# Patient Record
Sex: FEMALE
[De-identification: names, ages, dates, MRNs, and addresses within clinical notes are randomized per-mention and may not be internally consistent; named-entity substitution may affect disease eponyms.]

---

## 2017-05-12 ENCOUNTER — HOSPITAL ENCOUNTER (EMERGENCY)
Dept: HOSPITAL 14 - H.ER | Age: 32
Discharge: HOME | End: 2017-05-12
Payer: COMMERCIAL

## 2017-05-12 VITALS — BODY MASS INDEX: 23 KG/M2

## 2017-05-12 VITALS
OXYGEN SATURATION: 100 % | DIASTOLIC BLOOD PRESSURE: 69 MMHG | SYSTOLIC BLOOD PRESSURE: 107 MMHG | HEART RATE: 83 BPM | RESPIRATION RATE: 18 BRPM | TEMPERATURE: 98.8 F

## 2017-05-12 DIAGNOSIS — E86.0: ICD-10-CM

## 2017-05-12 DIAGNOSIS — R42: ICD-10-CM

## 2017-05-12 DIAGNOSIS — R55: Primary | ICD-10-CM

## 2017-05-12 LAB
ALBUMIN/GLOB SERPL: 1.4 {RATIO} (ref 1–2.1)
ALP SERPL-CCNC: 80 U/L (ref 38–126)
ALT SERPL-CCNC: 37 U/L (ref 9–52)
AST SERPL-CCNC: 43 U/L (ref 14–36)
BACTERIA #/AREA URNS HPF: (no result) /[HPF]
BASOPHILS # BLD AUTO: 0 K/UL (ref 0–0.2)
BASOPHILS NFR BLD: 0.2 % (ref 0–2)
BILIRUB SERPL-MCNC: 0.2 MG/DL (ref 0.2–1.3)
BILIRUB UR-MCNC: NEGATIVE MG/DL
BUN SERPL-MCNC: 10 MG/DL (ref 7–17)
CALCIUM SERPL-MCNC: 8.9 MG/DL (ref 8.4–10.2)
CHLORIDE SERPL-SCNC: 101 MMOL/L (ref 98–107)
CO2 SERPL-SCNC: 25 MMOL/L (ref 22–30)
COLOR UR: YELLOW
EOSINOPHIL # BLD AUTO: 0 K/UL (ref 0–0.7)
EOSINOPHIL NFR BLD: 0.2 % (ref 0–4)
ERYTHROCYTE [DISTWIDTH] IN BLOOD BY AUTOMATED COUNT: 13.2 % (ref 11.5–14.5)
ETHANOL SERPL-MCNC: < 10 MG/DL (ref 0–10)
GLOBULIN SER-MCNC: 2.9 GM/DL (ref 2.2–3.9)
GLUCOSE SERPL-MCNC: 115 MG/DL (ref 65–105)
GLUCOSE UR STRIP-MCNC: (no result) MG/DL
HCT VFR BLD CALC: 42.5 % (ref 34–47)
KETONES UR STRIP-MCNC: 20 MG/DL
LEUKOCYTE ESTERASE UR-ACNC: (no result) LEU/UL
LYMPHOCYTES # BLD AUTO: 0.8 K/UL (ref 1–4.3)
LYMPHOCYTES NFR BLD AUTO: 11.1 % (ref 20–40)
MAGNESIUM SERPL-MCNC: 1.9 MG/DL (ref 1.6–2.3)
MCH RBC QN AUTO: 30.8 PG (ref 27–31)
MCHC RBC AUTO-ENTMCNC: 33.3 G/DL (ref 33–37)
MCV RBC AUTO: 92.6 FL (ref 81–99)
MONOCYTES # BLD: 0.7 K/UL (ref 0–0.8)
MONOCYTES NFR BLD: 9.5 % (ref 0–10)
NEUTROPHILS # BLD: 5.4 K/UL (ref 1.8–7)
NEUTROPHILS NFR BLD AUTO: 79 % (ref 50–75)
NRBC BLD AUTO-RTO: 0.1 % (ref 0–0)
PH UR STRIP: 6 [PH] (ref 5–8)
PHOSPHATE SERPL-MCNC: 2.9 MG/DL (ref 2.5–4.5)
PLATELET # BLD: 236 K/UL (ref 130–400)
PMV BLD AUTO: 7.7 FL (ref 7.2–11.7)
POTASSIUM SERPL-SCNC: 3.6 MMOL/L (ref 3.6–5)
PROT SERPL-MCNC: 7.1 G/DL (ref 6.3–8.2)
PROT UR STRIP-MCNC: NEGATIVE MG/DL
RBC # UR STRIP: (no result) /UL
RBC #/AREA URNS HPF: 3 /HPF (ref 0–3)
SODIUM SERPL-SCNC: 137 MMOL/L (ref 132–148)
SP GR UR STRIP: 1.01 (ref 1–1.03)
UROBILINOGEN UR-MCNC: (no result) MG/DL (ref 0.2–1)
WBC # BLD AUTO: 6.9 K/UL (ref 4.8–10.8)
WBC #/AREA URNS HPF: 1 /HPF (ref 0–5)

## 2017-05-12 NOTE — ED PDOC
Syncope/Near Syncope/Dizzyness


Chief Complaint (Provider): Syncope


History Per: Patient


History/Exam Limitations: no limitations


Onset/Duration Of Symptoms: Mins (1)


Current Symptoms Are (Timing): Better


Number Of Syncopal Episodes: 1


Activity At Onset Of Symptoms: Sitting


Associated Symptoms Preceding Syncopal Episode: Lightheadedness


Seizure Or Post-ictal Symptoms: None


Fall Associated With With Symptoms: No


Additional Complaint(s): 


31 year old female with medical history of Celiac Disease presents to ED via 

EMS due to syncopal episode. Patient states was sitting at nail salon receiving 

a chair massage when she began to feel lightheadedness, followed by LOC, which 

was witnessed and approx 30-60 seconds. No reported shaking of body or foaming 

of mouth. Denies urinary/stool incontinence or tongue biting. No reported 

palpitations, chest pain, sob, headache, visual changes, nausea, vomiting, or 

diarrhea. Patient states had kale shake and veggie chips today, which is not 

unusual for her. Denies use of drugs or etoh. No fever, chills. Has been having 

symptoms of cold since Monday (nasal congestion/post nasal drip). Patient 

states she still feels some lightheadedness though denies weakness. 





PMD: Dr. Chiari





<Ross Mendez - Last Filed: 05/12/17 18:53>





<Tennille Mtz - Last Filed: 05/12/17 22:58>


Time Seen by Provider: 05/12/17 16:50


Chief Complaint (Nursing): Syncope





Supervising Attending Note





- Supervising Attending Note


The Documented history was done by the: Physician Extender, Attending Physician


The documented physical exam was done by the: Physician Extender, Attending 

Physician





- Attestation:


I have personally seen and examined this patient.: Yes


I have fully participated in the care of the patient.: Yes


I have reviewed all pertinent clinical information: Yes





<Tennille Mtz - Last Filed: 05/12/17 22:58>





Past Medical History


Reviewed: Historical Data, Nursing Documentation, Vital Signs


Vital Signs: 





 Last Vital Signs











Temp  98.8 F   05/12/17 16:46


 


Pulse  83   05/12/17 16:46


 


Resp  18   05/12/17 16:46


 


BP  107/69   05/12/17 16:46


 


Pulse Ox  100   05/12/17 16:46














- Family History


Family History: States: Unknown Family Hx





<Ross Mendez - Last Filed: 05/12/17 18:53>


Vital Signs: 





 Last Vital Signs











Temp  98.8 F   05/12/17 16:46


 


Pulse  83   05/12/17 16:46


 


Resp  18   05/12/17 16:46


 


BP  107/69   05/12/17 16:46


 


Pulse Ox  100   05/12/17 18:56














<Tennille Mtz - Last Filed: 05/12/17 22:58>





- Home Medications


Home Medications: 


 Ambulatory Orders











 Medication  Instructions  Recorded


 


Tamsulosin [Flomax] 0.4 mg PO DAILY #5 cap 02/25/17


 


traMADol [Ultram] 50 mg PO Q8 #10 tab 02/25/17


 


Ondansetron ODT [Zofran ODT] 4 mg PO Q6 PRN #10 odt 02/26/17














- Allergies


Allergies/Adverse Reactions: 


 Allergies











Allergy/AdvReac Type Severity Reaction Status Date / Time


 


wheat dextrin AdvReac  PAIN Verified 02/26/17 00:50














Review of Systems


ROS Statement: Except As Marked, All Systems Reviewed And Found Negative


Neurological: Positive for: Other (lightheadedness)





<Ross Mendez - Last Filed: 05/12/17 18:53>





Physical Exam





- Reviewed


Nursing Documentation Reviewed: Yes


Vital Signs Reviewed: Yes





- Physical Exam


Appears: Positive for: Well, Non-toxic, No Acute Distress


Head Exam: Positive for: NORMAL INSPECTION, NORMOCEPHALIC


Skin: Positive for: Normal Color, Warm, Dry.  Negative for: Pallor


Eye Exam: Positive for: Normal appearance, EOMI, PERRL.  Negative for: Nystagmus


ENT: Positive for: Normal ENT Inspection, Pharynx Is (clear)


Neck: Positive for: Normal, Painless ROM, Supple


Cardiovascular/Chest: Positive for: Regular Rate, Rhythm


Respiratory: Positive for: Normal Breath Sounds.  Negative for: Rales, Wheezing


Gastrointestinal/Abdominal: Positive for: Normal Exam, Bowel Sounds (present), 

Soft.  Negative for: Tenderness, Organomegaly, Mass


Back: Positive for: Normal Inspection.  Negative for: L CVA Tenderness, R CVA 

Tenderness


Extremity: Positive for: Normal ROM.  Negative for: Tenderness, Pedal Edema


Neurologic/Psych: Positive for: Alert, CNs II-XII, Oriented, Cerebellar Tests (

negative including romberg), Gait (steady, no obvious abnormalities).  Negative 

for: Motor/Sensory Deficits, Facial Droop





<Ross Mendez - Last Filed: 05/12/17 18:53>





- Laboratory Results


Result Diagrams: 


 05/12/17 17:20





 05/12/17 17:20


Urine Pregnancy POC: Negative


Urine dip results: Positive for: Ketones





- ECG


ECG: Positive for: Interpreted By Me, Viewed By Me


ECG Rhythm: Positive for: Normal QRS, Normal ST Segment, Sinus Rhythm


O2 Sat by Pulse Oximetry: 100


Pulse Ox Interpretation: Normal





- Progress


ED Course And Treament: 


Time: 1720





Initial Impression: 32 yo F with syncopal episodes (LOC x 30-60 sec) with 

associated lightheadedness. Vitals stable. Afebrile. PE unremarkable. DDx 

includes but not limited to, vasovagal syncope, cardiac syncope, dehydration, 

hypoglycemia





Plan:


* CBC


* CMP


* UA


* URINE PREG


* EKG


* POC GLUCOSE


* CARDIAC MONITORING


* ALCOHOL SERUM


* UDS


* MG/PHOS


* REEVAL





Time: 1830


Patient re-evaluated. Improved symptoms. Labs reviewed, unremarkable except for 

mild ketonuria, small microscopic hematuria. Patient informed of results and 

importance of hydration. Patient to follow up with PMD and return to ED if 

worsening symptoms.





<Ross Mendez - Last Filed: 05/12/17 18:53>





- Laboratory Results


Result Diagrams: 


 05/12/17 17:20





 05/12/17 17:20





<Tennille Mtz - Last Filed: 05/12/17 22:58>





Disposition





- Patient ED Disposition


Is Patient to be Admitted: No


Counseled Patient/Family Regarding: Studies Performed, Diagnosis, Need For 

Followup





- Disposition


Disposition: Routine/Home


Disposition Time: 18:46





<Ross Mendez - Last Filed: 05/12/17 18:53>





<Tennille Mtz - Last Filed: 05/12/17 22:58>





- Clinical Impression


Clinical Impression: 


 Dehydration, Fainting spell








- Disposition


Referrals: 


Anusha Rodriguez MD [Staff Provider] - 


Condition: STABLE


Additional Instructions: 


Follow up with PMD in 2-3 days.


Return to ED for worsening symptoms


Instructions:  Dehydration (ED), Syncope (ED), Lightheadedness (ED)


Print Language: ENGLISH

## 2017-05-13 NOTE — CARD
--------------- APPROVED REPORT --------------





EKG Measurement

Heart [id number]

VT [id number]

[id number]

[id number]

[id number]



<Conclusion>

Normal sinus rhythm

Normal ECG

## 2017-07-19 ENCOUNTER — HOSPITAL ENCOUNTER (EMERGENCY)
Dept: HOSPITAL 14 - H.ER | Age: 32
Discharge: HOME | End: 2017-07-19
Payer: COMMERCIAL

## 2017-07-19 VITALS — SYSTOLIC BLOOD PRESSURE: 105 MMHG | RESPIRATION RATE: 18 BRPM | DIASTOLIC BLOOD PRESSURE: 68 MMHG | HEART RATE: 80 BPM

## 2017-07-19 VITALS — BODY MASS INDEX: 24.7 KG/M2

## 2017-07-19 VITALS — OXYGEN SATURATION: 98 % | TEMPERATURE: 98 F

## 2017-07-19 DIAGNOSIS — R31.9: Primary | ICD-10-CM

## 2017-07-19 LAB
ALBUMIN/GLOB SERPL: 1.4 {RATIO} (ref 1–2.1)
ALP SERPL-CCNC: 68 U/L (ref 38–126)
ALT SERPL-CCNC: 36 U/L (ref 9–52)
AST SERPL-CCNC: 34 U/L (ref 14–36)
BASOPHILS # BLD AUTO: 0 K/UL (ref 0–0.2)
BASOPHILS NFR BLD: 0.2 % (ref 0–2)
BILIRUB SERPL-MCNC: 0.5 MG/DL (ref 0.2–1.3)
BILIRUB UR-MCNC: NEGATIVE MG/DL
BUN SERPL-MCNC: 13 MG/DL (ref 7–17)
CALCIUM SERPL-MCNC: 9.1 MG/DL (ref 8.4–10.2)
CHLORIDE SERPL-SCNC: 106 MMOL/L (ref 98–107)
CO2 SERPL-SCNC: 23 MMOL/L (ref 22–30)
COLOR UR: YELLOW
EOSINOPHIL # BLD AUTO: 0 K/UL (ref 0–0.7)
EOSINOPHIL NFR BLD: 0.6 % (ref 0–4)
ERYTHROCYTE [DISTWIDTH] IN BLOOD BY AUTOMATED COUNT: 13.7 % (ref 11.5–14.5)
GLOBULIN SER-MCNC: 2.9 GM/DL (ref 2.2–3.9)
GLUCOSE SERPL-MCNC: 86 MG/DL (ref 65–105)
GLUCOSE UR STRIP-MCNC: (no result) MG/DL
HCT VFR BLD CALC: 43.2 % (ref 34–47)
KETONES UR STRIP-MCNC: 20 MG/DL
LEUKOCYTE ESTERASE UR-ACNC: (no result) LEU/UL
LYMPHOCYTES # BLD AUTO: 0.8 K/UL (ref 1–4.3)
LYMPHOCYTES NFR BLD AUTO: 13.4 % (ref 20–40)
MCH RBC QN AUTO: 31.3 PG (ref 27–31)
MCHC RBC AUTO-ENTMCNC: 33 G/DL (ref 33–37)
MCV RBC AUTO: 94.8 FL (ref 81–99)
MONOCYTES # BLD: 0.7 K/UL (ref 0–0.8)
MONOCYTES NFR BLD: 13.2 % (ref 0–10)
NEUTROPHILS # BLD: 4.1 K/UL (ref 1.8–7)
NEUTROPHILS NFR BLD AUTO: 72.6 % (ref 50–75)
NRBC BLD AUTO-RTO: 0 % (ref 0–0)
PH UR STRIP: 5 [PH] (ref 5–8)
PLATELET # BLD: 268 K/UL (ref 130–400)
PMV BLD AUTO: 7.8 FL (ref 7.2–11.7)
POTASSIUM SERPL-SCNC: 4.3 MMOL/L (ref 3.6–5)
PROT SERPL-MCNC: 7.1 G/DL (ref 6.3–8.2)
PROT UR STRIP-MCNC: NEGATIVE MG/DL
RBC # UR STRIP: (no result) /UL
RBC #/AREA URNS HPF: 9 /HPF (ref 0–3)
SODIUM SERPL-SCNC: 137 MMOL/L (ref 132–148)
SP GR UR STRIP: 1.03 (ref 1–1.03)
UROBILINOGEN UR-MCNC: (no result) MG/DL (ref 0.2–1)
WBC # BLD AUTO: 5.6 K/UL (ref 4.8–10.8)
WBC #/AREA URNS HPF: 1 /HPF (ref 0–5)

## 2017-07-19 NOTE — ED PDOC
HPI: Back





<Tiara Ventura Y - Last Filed: 17 12:40>


Chief Complaint (Provider): Back pain, nausea


History Per: Patient


History/Exam Limitations: no limitations


Onset/Duration Of Symptoms: Days (1)


Current Symptoms Are (Timing): Still Present


Quality Of Discomfort: Unable To Describe


Severity: Mild


Pain Scale Rating Of: 1


Exacerbating Factor(s): Movement


Additional Complaint(s): 





32 year old female presents with chills, nausea, back pain that began overnight

, and urinary frequency for the past few days. Patient took temp at home 

overnight was approx. 99.0F, denies chest pain, dyspnea, dysuria, hematuria, 

diarrhea, pedal edema. Reports having nephrolithiasis in 2017, but she 

passed the stone, these symptoms are similar to last presentation to ED but 

were more gradual. She took a tramadol last night, with relief of pain, but 

nausea persisted. She has no appetite. Does not think she is pregnant, although 

she is trying to conceive. LMP: 17.  














<Taqueria Biggs - Last Filed: 17 11:32>


Time Seen by Provider: 17 09:23


Chief Complaint (Nursing): Back Pain





Past Medical History


Vital Signs: 


 Last Vital Signs











Temp  98 F   17 09:23


 


Pulse  83   17 09:23


 


Resp  16   17 09:23


 


BP  99/59 L  17 09:23


 


Pulse Ox  98   17 12:06














<AudreyTiara Y - Last Filed: 17 12:40>


Vital Signs: 


 Last Vital Signs











Temp  98 F   17 09:23


 


Pulse  83   17 09:23


 


Resp  16   17 09:23


 


BP  99/59 L  17 09:23


 


Pulse Ox  98   17 09:23














- Medical History


PMH: Kidney Stones





- Surgical History


Surgical History: 





- Family History


Family History: States: Unknown Family Hx





<Taqueria Biggs - Last Filed: 17 11:32>





- Home Medications


Home Medications: 


 Ambulatory Orders











 Medication  Instructions  Recorded


 


Tamsulosin [Flomax] 0.4 mg PO DAILY #5 cap 17


 


traMADol [Ultram] 50 mg PO Q8 #10 tab 17


 


Ondansetron ODT [Zofran ODT] 4 mg PO Q6 PRN #10 odt 17


 


Nitrofurantoin Macrocrystals 100 mg PO BID #14 cap 17





[Macrobid]  


 


Tamsulosin [Flomax] 0.4 mg PO DAILY #14 cap 17


 


oxyCODONE/Acetaminophen [Percocet 1 tab PO Q6H PRN #5 tab 17





5/325 mg Tab]  














- Allergies


Allergies/Adverse Reactions: 


 Allergies











Allergy/AdvReac Type Severity Reaction Status Date / Time


 


wheat dextrin AdvReac  PAIN Verified 17 09:27














Physical Exam





- Reviewed


Vital Signs Reviewed: Yes (BP: 99/59)





- Physical Exam


Appears: Positive for: Uncomfortable


Skin: Positive for: Normal Color


Eye Exam: Positive for: Normal appearance


Neck: Positive for: Normal, Painless ROM


Cardiovascular/Chest: Positive for: Regular Rate, Rhythm.  Negative for: Edema, 

JVD, Murmur, Bradycardia, Tachycardia


Respiratory: Positive for: Normal Breath Sounds.  Negative for: Accessory 

Muscle Use, Crackles, Rales, Rhonchi, Wheezing, Respiratory Distress


Gastrointestinal/Abdominal: Positive for: Bowel Sounds, Soft, Tenderness (right 

lumbar, suprapubic ).  Negative for: Organomegaly, Distended, Guarding, Rebound


Back: Positive for: Normal Inspection.  Negative for: L CVA Tenderness, R CVA 

Tenderness


Rectal: Positive for: Deferred


Extremity: Negative for: Pedal Edema


Neurologic/Psych: Positive for: Alert, CNs II-XII, Oriented, Mood/Affect (

appropriate)





<Taqueria Biggs - Last Filed: 17 11:32>





- Laboratory Results


Result Diagrams: 


 17 10:26





 17 10:26





<Tiara Ventura - Last Filed: 17 12:40>





- Laboratory Results


Result Diagrams: 


 17 10:26





 17 10:26


Urine Pregnancy POC: Negative


Urine dip results: Positive for: Blood (moderate)





- ECG


O2 Sat by Pulse Oximetry: 98





- Progress


ED Course And Treament: 


patients presentation concerning for nepthrolihiasis vs UTI





* CBC


* CMP


* UA


* URINE C&S


* ZOFRAN


* PEPCID


* IVF





case d/w Dr. Ventura who agrees with the assessment and plan delineated above.





Reassessment : 12:00


Denies pain, nausea improved. 





CBC, CMP WNL


UA: moderate blood


Upreg negative





findings d/w Dr. Ventura








<Taqueria Biggs - Last Filed: 17 11:32>





Medical Decision Making


Medical Decision Making: 





Reevaluation 12:41


Pt is feeling better and tolerating PO. Explained to pt that blood in urine can 

be kidney stones, but pt is comfortable and tolerating PO. No need for imaging 

at this time. 


Labs reviewed. 


Instructed for outpatient urology follow-up. Patient agreed to treatment plan. 





--------------------------------------------------------------------------------

----------------- 


Scribe Attestation:


Documented by Evangelina Dave, acting as a scribe for Tiara Ventura MD.


 


Provider Scribe Attestation:


All medical record entries made by the Scribe were at my direction and 

personally dictated by me. I have reviewed the chart and agree that the record 

accurately reflects my personal performance of the history, physical exam, 

medical decision making, and the department course for this patient. I have 

also personally directed, reviewed, and agree with the discharge instructions 

and disposition.








<Tiara Ventura - Last Filed: 17 12:40>





Disposition





<Tiara Ventura - Last Filed: 17 12:40>





- Disposition


Disposition Time: 11:32





<Taqueria Biggs - Last Filed: 17 11:32>





- Clinical Impression


Clinical Impression: 


 Hematuria








- Disposition


Referrals: 


Jordy Simpson MD [Medical Doctor] - 


Condition: IMPROVED


Additional Instructions: 


follow up with urologist in 1-2 days


return to the ED with any worsening or concerning symptoms. 


Prescriptions: 


Nitrofurantoin Macrocrystals [Macrobid] 100 mg PO BID #14 cap


oxyCODONE/Acetaminophen [Percocet 5/325 mg Tab] 1 tab PO Q6H PRN #5 tab


 PRN Reason: Pain, Moderate (4-7)


Tamsulosin [Flomax] 0.4 mg PO DAILY #14 cap


Instructions:  Acute Hematuria (ED)

## 2018-01-01 ENCOUNTER — HOSPITAL ENCOUNTER (EMERGENCY)
Dept: HOSPITAL 14 - H.ER | Age: 33
LOS: 1 days | Discharge: HOME | End: 2018-01-02
Payer: COMMERCIAL

## 2018-01-01 VITALS — OXYGEN SATURATION: 100 %

## 2018-01-01 VITALS — SYSTOLIC BLOOD PRESSURE: 116 MMHG | DIASTOLIC BLOOD PRESSURE: 95 MMHG | HEART RATE: 97 BPM

## 2018-01-01 VITALS — RESPIRATION RATE: 16 BRPM | TEMPERATURE: 98.3 F

## 2018-01-01 VITALS — BODY MASS INDEX: 24.7 KG/M2

## 2018-01-01 DIAGNOSIS — O98.512: ICD-10-CM

## 2018-01-01 DIAGNOSIS — N18.9: ICD-10-CM

## 2018-01-01 DIAGNOSIS — O99.89: Primary | ICD-10-CM

## 2018-01-01 DIAGNOSIS — Z87.442: ICD-10-CM

## 2018-01-01 DIAGNOSIS — O26.832: ICD-10-CM

## 2018-01-01 DIAGNOSIS — O26.892: ICD-10-CM

## 2018-01-01 DIAGNOSIS — Z3A.18: ICD-10-CM

## 2018-01-01 DIAGNOSIS — Z36.9: ICD-10-CM

## 2018-01-01 LAB
ALBUMIN SERPL-MCNC: 3.3 G/DL (ref 3.5–5)
ALBUMIN/GLOB SERPL: 1.1 {RATIO} (ref 1–2.1)
ALT SERPL-CCNC: 36 U/L (ref 9–52)
ANISOCYTOSIS BLD QL SMEAR: SLIGHT
AST SERPL-CCNC: 21 U/L (ref 14–36)
BASOPHILS # BLD AUTO: 0 K/UL (ref 0–0.2)
BASOPHILS NFR BLD: 0.2 % (ref 0–2)
BUN SERPL-MCNC: 8 MG/DL (ref 7–17)
CALCIUM SERPL-MCNC: 8.3 MG/DL (ref 8.4–10.2)
EOSINOPHIL # BLD AUTO: 0 K/UL (ref 0–0.7)
EOSINOPHIL NFR BLD: 0.1 % (ref 0–4)
EOSINOPHIL NFR BLD: 1 % (ref 0–7)
ERYTHROCYTE [DISTWIDTH] IN BLOOD BY AUTOMATED COUNT: 13 % (ref 11.5–14.5)
GFR NON-AFRICAN AMERICAN: > 60
HGB BLD-MCNC: 11.9 G/DL (ref 12–16)
LYMPHOCYTE: 3 % (ref 20–50)
LYMPHOCYTES # BLD AUTO: 0.5 K/UL (ref 1–4.3)
LYMPHOCYTES NFR BLD AUTO: 2.6 % (ref 20–40)
MAGNESIUM SERPL-MCNC: 1.6 MG/DL (ref 1.6–2.3)
MCH RBC QN AUTO: 32.4 PG (ref 27–31)
MCHC RBC AUTO-ENTMCNC: 34.5 G/DL (ref 33–37)
MCV RBC AUTO: 93.9 FL (ref 81–99)
MONOCYTE: 4 % (ref 0–10)
MONOCYTES # BLD: 0.9 K/UL (ref 0–0.8)
MONOCYTES NFR BLD: 4.7 % (ref 0–10)
NEUTROPHILS # BLD: 17.4 K/UL (ref 1.8–7)
NEUTROPHILS NFR BLD AUTO: 91 % (ref 42–75)
NEUTROPHILS NFR BLD AUTO: 92.4 % (ref 50–75)
NEUTS BAND NFR BLD: 1 % (ref 0–2)
NRBC BLD AUTO-RTO: 0 % (ref 0–0)
PLATELET # BLD EST: NORMAL 10*3/UL
PLATELET # BLD: 280 K/UL (ref 130–400)
PMV BLD AUTO: 7.2 FL (ref 7.2–11.7)
RBC # BLD AUTO: 3.68 MIL/UL (ref 3.8–5.2)
TOTAL CELLS COUNTED BLD: 100
WBC # BLD AUTO: 18.9 K/UL (ref 4.8–10.8)

## 2018-01-01 PROCEDURE — 87430 STREP A AG IA: CPT

## 2018-01-01 PROCEDURE — 87804 INFLUENZA ASSAY W/OPTIC: CPT

## 2018-01-01 PROCEDURE — 84100 ASSAY OF PHOSPHORUS: CPT

## 2018-01-01 PROCEDURE — 76815 OB US LIMITED FETUS(S): CPT

## 2018-01-01 PROCEDURE — 93005 ELECTROCARDIOGRAM TRACING: CPT

## 2018-01-01 PROCEDURE — 83605 ASSAY OF LACTIC ACID: CPT

## 2018-01-01 PROCEDURE — 86850 RBC ANTIBODY SCREEN: CPT

## 2018-01-01 PROCEDURE — 83735 ASSAY OF MAGNESIUM: CPT

## 2018-01-01 PROCEDURE — 85025 COMPLETE CBC W/AUTO DIFF WBC: CPT

## 2018-01-01 PROCEDURE — 96374 THER/PROPH/DIAG INJ IV PUSH: CPT

## 2018-01-01 PROCEDURE — 80053 COMPREHEN METABOLIC PANEL: CPT

## 2018-01-01 PROCEDURE — 99285 EMERGENCY DEPT VISIT HI MDM: CPT

## 2018-01-01 PROCEDURE — 82948 REAGENT STRIP/BLOOD GLUCOSE: CPT

## 2018-01-01 PROCEDURE — 82550 ASSAY OF CK (CPK): CPT

## 2018-01-01 PROCEDURE — 87070 CULTURE OTHR SPECIMN AEROBIC: CPT

## 2018-01-01 PROCEDURE — 86900 BLOOD TYPING SEROLOGIC ABO: CPT

## 2018-01-01 NOTE — ED PDOC
Syncope/Near Syncope/Dizziness


Time Seen by Provider: 18 18:29


Chief Complaint (Nursing): Syncope


Chief Complaint (Provider): Syncope


History Per: Patient, Family ()


History/Exam Limitations: no limitations


Onset/Duration Of Symptoms: Mins (prior to arrival)


Additional Complaint(s): 


32 year old female, who is approximately 18 weeks pregnant, presents to the ER 

after syncopal episode today. Patient states she woke up with sore throat this 

morning, and over the course of day began having episodes of watery non-bloody 

diarrhea. Just prior to arrival, she became nauseous and attempted to vomit, 

but passed out.  reported patient had a few seconds of convulsive 

activity, after which she woke up with no post-ictal state or urinary 

incontinence. Patient has history of syncopal episodes in the past. Able to 

tolerate fluids today but no appetite. Also reports crampy abdominal pain. 

Patient states that her 1yo child had loose stools this past week.





PMD: Dr. Ramirez 





Past Medical History


Reviewed: Historical Data, Nursing Documentation, Vital Signs


Vital Signs: 





 Last Vital Signs











Temp  98.3 F   18 18:23


 


Pulse  97 H  18 19:28


 


Resp  16   18 19:28


 


BP  116/95 H  18 19:28


 


Pulse Ox  100   18 19:28














- Medical History


PMH: Kidney Stones, Chronic Kidney Disease


Other PMH: Ciliac disease 





- Surgical History


Surgical History: 





- Family History


Family History: States: Unknown Family Hx





- Social History


Current smoker - smoking cessation education provided: No


Alcohol: None





- Home Medications


Home Medications: 


 Ambulatory Orders











 Medication  Instructions  Recorded


 


Tamsulosin [Flomax] 0.4 mg PO DAILY #5 cap 17


 


traMADol [Ultram] 50 mg PO Q8 #10 tab 17


 


Ondansetron ODT [Zofran ODT] 4 mg PO Q6 PRN #10 odt 17


 


Nitrofurantoin Macrocrystals 100 mg PO BID #14 cap 17





[Macrobid]  


 


Tamsulosin [Flomax] 0.4 mg PO DAILY #14 cap 17


 


oxyCODONE/Acetaminophen [Percocet 1 tab PO Q6H PRN #5 tab 17





5/325 mg Tab]  


 


Dicyclomine [Bentyl] 20 mg PO BID PRN #30 tab 18


 


Ondansetron ODT [Zofran ODT] 1 odt PO Q6 PRN #20 odt 18


 


Oseltamivir [Tamiflu] 75 mg PO BID #10 cap 18














- Allergies


Allergies/Adverse Reactions: 


 Allergies











Allergy/AdvReac Type Severity Reaction Status Date / Time


 


wheat dextrin AdvReac  PAIN Verified 17 09:27














Review of Systems


ROS Statement: Except As Marked, All Systems Reviewed And Found Negative (As 

per HPI, otherwise negative)


Gastrointestinal: Positive for: Nausea, Abdominal Pain, Diarrhea.  Negative for

: Vomiting


Genitourinary Female: Negative for: Incontinence


Neurological: Positive for: Other (syncope)





Physical Exam





- Reviewed


Nursing Documentation Reviewed: Yes


Vital Signs Reviewed: Yes





- Physical Exam


Appears: Positive for: Non-toxic, No Acute Distress (but tired appearing)


Head Exam: Positive for: ATRAUMATIC, NORMOCEPHALIC


Skin: Positive for: Warm, Dry, Pallor


Eye Exam: Positive for: EOMI, PERRL


ENT: Positive for: Pharynx Is (clear with dry mucus membranes)


Neck: Positive for: Painless ROM, Supple


Cardiovascular/Chest: Positive for: Regular Rate, Rhythm, Chest Non Tender.  

Negative for: Murmur


Respiratory: Positive for: Normal Breath Sounds.  Negative for: Respiratory 

Distress


Gastrointestinal/Abdominal: Positive for: Bowel Sounds, Soft, Tenderness (mild 

lower abdominal).  Negative for: Mass, Distended, Guarding, Rebound


Back: Negative for: Decreased ROM


Extremity: Positive for: Normal ROM.  Negative for: Pedal Edema, Deformity


Lymphatic: Negative for: Adenopathy


Neurologic/Psych: Positive for: Alert, Oriented (x3), Mood/Affect (mildly 

anxious affect).  Negative for: Motor/Sensory Deficits





- Laboratory Results


Result Diagrams: 


 18 19:04





 18 19:04





- ECG


O2 Sat by Pulse Oximetry: 100 (RA)


Pulse Ox Interpretation: Normal





Medical Decision Making


Medical Decision Making: 


Initial Impression: Syncope, diarrheal illness


Differential includes but is not limited to: gastroenteritis, dehydration, 

electrolyte abnormality, hypovolemia, or viral illness





Time: 18:42


Initial Plan:


* EKG


* Labs


* Urine dipstick 


* Rapid strep test


* Influenza A B


* Throat culture


* Lactated Ringers IV 


* Zofran 4 mg IV


* Dextrose 5%/Lactated Ringers IV


* Pending US OB Pregnancy





________________________________________________________________________________

_____


Patient Name: GINA WATSON MRN: [id number]


 (Age): 1985 32 Gender: F


Date of Exam: 2018 Accession: [id number]


Referring Physician: Lola Simmons # of Images: [id number]


Ordered As: US OB PREGNANCY LIMITED


Page 1 of 2


EXAM:


US Pregnancy After First Trimester, Transabdominal


EXAM DATE/TIME:


2018 7:02 PM


CLINICAL HISTORY:


32 years old, female; Signs and symptoms; Lmp or gestational age (in weeks): ; Other:


Syncope; Pregnant; Additional info: Abd pain syncope R/O fetal demise


TECHNIQUE:


Real-time transabdominal obstetrical ultrasound of the maternal pelvis and a 

second or third


trimester pregnancy with image documentation.


COMPARISON:


There are no prior studies for comparison.





FINDINGS:


Fetus: There is a single living intrauterine gestation in breech presentation. 

There is a fetal heart rate


of 145 beats per minute. Fetal motion was seen at real-time.


Placenta: Placenta is posterior and low-lying.


Amniotic fluid: Amnionic fluid volume appears normal.


Anatomy: Early gestational age limits evaluation of fetal anatomy.





BIOMETRICS


Gestational age by US: 18 weeks 5 days





EFW: 236 g





MATERNAL:


Cervix: Cervix measures approximately 4 cm in length





IMPRESSION: 18 week 5 day single breech fetus, estimated date of delivery ; low-lying


posterior placenta


________________________________________________________________________________

_____





Labs demonstrate leukocytosis, which is consistent with acute infectious 

illness. Otherwise, no emergently significant abnormalities. 





Time: 22:40


Patient reporting she is feeling better. She walked to the bathroom without 

lightheadedness. Discussed w/ patient findings, and she is agreeable to 

discharge plan. Will follow up with OB or primary care.





--------------------------------------------------------------------------------

-----------------


Scribe Attestation:


Documented by Louann Thacker, acting as a scribe for Lola Simmons MD





Provider Scribe Attestation:


All medical record entries made by the Scribe were at my direction and 

personally dictated by me. I have reviewed the chart and agree that the record 

accurately reflects my personal performance of the history, physical exam, 

medical decision making, and the department course for this patient. I have 

also personally directed, reviewed, and agree with the discharge instructions 

and disposition.





Disposition





- Clinical Impression


Clinical Impression: 


 Syncope, Viral illness








- Disposition


Referrals: 


Jose Eduardo Ramirez MD [Staff Provider] - 18


Disposition: Routine/Home


Disposition Time: 20:00


Condition: IMPROVED


Additional Instructions: 


REST AND DRINK PLENTY OF HYDRATING FLUIDS


Prescriptions: 


Dicyclomine [Bentyl] 20 mg PO BID PRN #30 tab


 PRN Reason: abdominal pain


Ondansetron ODT [Zofran ODT] 1 odt PO Q6 PRN #20 odt


 PRN Reason: Nausea/Vomiting


Oseltamivir [Tamiflu] 75 mg PO BID #10 cap


Instructions:  Syncope (ED), Gastroenteritis (ED), Nutrition Tips for Relief of 

Diarrhea (ED)


Forms:  [facility] ED School/Work Excuse

## 2018-01-02 NOTE — CARD
--------------- APPROVED REPORT --------------





EKG Measurement

Heart [id number]

VA [id number]

[id number]

[id number]

[id number]



<Conclusion>

Normal sinus rhythm

Normal ECG

## 2018-01-02 NOTE — US
PROCEDURE:  Obstetrical ultrasound examination



HISTORY:

abd pain syncope r/o fetal demise



COMPARISON:

Not available



TECHNIQUE:

Transabdominal



FINDINGS:

The examination demonstrates a single live intrauterine gestation in 

breech presentation.  The fetal heart rate is 145 beats per minute.  

A normal quantity of amniotic fluid is visualized grossly. A 

posterior placenta is identified.  The placenta is low-lying.  There 

is no evidence of placenta previa. The cervix is closed and measures 

4.0 cm in length.



Fetal biometry yields a gestational age by ultrasound of 18 weeks 5 

days.  The LOUIE by ultrasound is 5/30/2018.  The EFW is 236.16 g.



Neither the right nor the left ovary are visualized.  There are no 

adnexal masses seen.



Fetal anatomy was not formally evaluated at this time.



IMPRESSION:

Single live intrauterine gestation of approximately 18 weeks 5 days.  

Breech presentation.  Low-lying placenta. Cervix long and closed.  

Fetal 145.



Preliminary interpretation of this examination was reported by 

[facility] at 9:57 p.m. on 1/1/2018. There is concurrence of 

this report with the preliminary interpretation.

## 2018-07-26 ENCOUNTER — HOSPITAL ENCOUNTER (EMERGENCY)
Dept: HOSPITAL 14 - H.ER | Age: 33
Discharge: HOME | End: 2018-07-26
Payer: COMMERCIAL

## 2018-07-26 VITALS
TEMPERATURE: 98 F | RESPIRATION RATE: 16 BRPM | HEART RATE: 86 BPM | DIASTOLIC BLOOD PRESSURE: 71 MMHG | SYSTOLIC BLOOD PRESSURE: 122 MMHG

## 2018-07-26 VITALS — BODY MASS INDEX: 24.7 KG/M2

## 2018-07-26 DIAGNOSIS — R51: Primary | ICD-10-CM

## 2018-07-26 DIAGNOSIS — R11.0: ICD-10-CM

## 2018-07-26 LAB
ALBUMIN SERPL-MCNC: 4.1 G/DL (ref 3.5–5)
ALBUMIN/GLOB SERPL: 1.4 {RATIO} (ref 1–2.1)
ALT SERPL-CCNC: 28 U/L (ref 9–52)
AST SERPL-CCNC: 53 U/L (ref 14–36)
BASOPHILS # BLD AUTO: 0 K/UL (ref 0–0.2)
BASOPHILS NFR BLD: 0.6 % (ref 0–2)
BILIRUB UR-MCNC: NEGATIVE MG/DL
BUN SERPL-MCNC: 10 MG/DL (ref 7–17)
CALCIUM SERPL-MCNC: 8.9 MG/DL (ref 8.4–10.2)
COLOR UR: YELLOW
EOSINOPHIL # BLD AUTO: 0.1 K/UL (ref 0–0.7)
EOSINOPHIL NFR BLD: 0.8 % (ref 0–4)
ERYTHROCYTE [DISTWIDTH] IN BLOOD BY AUTOMATED COUNT: 12.2 % (ref 11.5–14.5)
GFR NON-AFRICAN AMERICAN: > 60
GLUCOSE UR STRIP-MCNC: (no result) MG/DL
HGB BLD-MCNC: 12.5 G/DL (ref 12–16)
LEUKOCYTE ESTERASE UR-ACNC: (no result) LEU/UL
LYMPHOCYTES # BLD AUTO: 1.7 K/UL (ref 1–4.3)
LYMPHOCYTES NFR BLD AUTO: 23 % (ref 20–40)
MCH RBC QN AUTO: 32.3 PG (ref 27–31)
MCHC RBC AUTO-ENTMCNC: 34.9 G/DL (ref 33–37)
MCV RBC AUTO: 92.5 FL (ref 81–99)
MONOCYTES # BLD: 0.7 K/UL (ref 0–0.8)
MONOCYTES NFR BLD: 9.8 % (ref 0–10)
NEUTROPHILS # BLD: 4.9 K/UL (ref 1.8–7)
NEUTROPHILS NFR BLD AUTO: 65.8 % (ref 50–75)
NRBC BLD AUTO-RTO: 0.1 % (ref 0–0)
PH UR STRIP: 6 [PH] (ref 5–8)
PLATELET # BLD: 357 K/UL (ref 130–400)
PMV BLD AUTO: 8 FL (ref 7.2–11.7)
PROT UR STRIP-MCNC: 30 MG/DL
RBC # BLD AUTO: 3.88 MIL/UL (ref 3.8–5.2)
RBC # UR STRIP: NEGATIVE /UL
SP GR UR STRIP: 1.02 (ref 1–1.03)
SQUAMOUS EPITHIAL: < 1 /HPF (ref 0–5)
URINE CLARITY: (no result)
UROBILINOGEN UR-MCNC: (no result) MG/DL (ref 0.2–1)
WBC # BLD AUTO: 7.4 K/UL (ref 4.8–10.8)

## 2018-07-26 PROCEDURE — 96365 THER/PROPH/DIAG IV INF INIT: CPT

## 2018-07-26 PROCEDURE — 99284 EMERGENCY DEPT VISIT MOD MDM: CPT

## 2018-07-26 PROCEDURE — 70450 CT HEAD/BRAIN W/O DYE: CPT

## 2018-07-26 PROCEDURE — 96361 HYDRATE IV INFUSION ADD-ON: CPT

## 2018-07-26 PROCEDURE — 85025 COMPLETE CBC W/AUTO DIFF WBC: CPT

## 2018-07-26 PROCEDURE — 80053 COMPREHEN METABOLIC PANEL: CPT

## 2018-07-26 PROCEDURE — 96375 TX/PRO/DX INJ NEW DRUG ADDON: CPT

## 2018-07-26 PROCEDURE — 81003 URINALYSIS AUTO W/O SCOPE: CPT

## 2018-07-26 NOTE — CT
Date of service: 



07/26/2018



PROCEDURE:  CT HEAD WITHOUT CONTRAST.



HISTORY:

headache, vomiting



COMPARISON:

Noncontrast head CT 12/18/2012. 



TECHNIQUE:

Axial computed tomography images were obtained through the head/brain 

without intravenous contrast.  



Radiation dose:



Total exam DLP = 912.55 mGy-cm.



This CT exam was performed using one or more of the following dose 

reduction techniques: Automated exposure control, adjustment of the 

mA and/or kV according to patient size, and/or use of iterative 

reconstruction technique.



FINDINGS:



HEMORRHAGE:

No intracranial hemorrhage. 



BRAIN:

Normal gray-white matter differentiation and density are appreciated 

throughout the cerebrum and cerebellum with the brainstem appearing 

unremarkable as well.  There is no mass effect.  There is no 

suspicious extra-axial fluid collection and the midline brain anatomy 

appears diffusely unremarkable. 



VENTRICLES:

Unremarkable. No hydrocephalus. 



CALVARIUM:

Unremarkable.



PARANASAL SINUSES:

Unremarkable as visualized. No significant inflammatory changes.



MASTOID AIR CELLS:

Unremarkable as visualized. No inflammatory changes.



OTHER FINDINGS:

None.



IMPRESSION:

Stable, normal-appearing CT of the Head.

## 2018-07-26 NOTE — ED PDOC
HPI:  Headache


Time Seen by Provider: 18 03:15


Chief Complaint (Nursing): Headache


Chief Complaint (Provider): headache


History Per: Patient


History/Exam Limitations: no limitations


Onset/Duration Of Symptoms: Hrs


Current Symptoms Are (Timing): Still Present


Quality: "Pain"


Associated Symptoms: Photophobia, Nausea, Vomiting


Additional Complaint(s): 





34 y/o female presents for evaluation of frontal headache x 10 hours.  Patient 

states headache started frontal and now spread around head like a band.  

Associated photophobia, nausea, and vomiting.  Patient states she can not keep 

liquids or medication for headache down, which prompted ED visit.  Denies fever

, neck/back pain, extremity numbness/weakness, vision changes, chest pain, 

shortness of breath, palpitations, abdominal pain.  





Past Medical History


Reviewed: Historical Data, Nursing Documentation, Vital Signs


Vital Signs: 





 Last Vital Signs











Temp  97.9 F   18 03:11


 


Pulse  70   18 03:11


 


Resp  18   18 03:11


 


BP  118/73   18 03:11


 


Pulse Ox  98   18 03:11














- Medical History


PMH: Kidney Stones, Chronic Kidney Disease





- Surgical History


Surgical History: 





- Family History


Family History: States: Unknown Family Hx





- Home Medications


Home Medications: 


 Ambulatory Orders











 Medication  Instructions  Recorded


 


Tamsulosin [Flomax] 0.4 mg PO DAILY #5 cap 17


 


traMADol [Ultram] 50 mg PO Q8 #10 tab 17


 


Ondansetron ODT [Zofran ODT] 4 mg PO Q6 PRN #10 odt 17


 


Nitrofurantoin Macrocrystals 100 mg PO BID #14 cap 17





[Macrobid]  


 


Tamsulosin [Flomax] 0.4 mg PO DAILY #14 cap 17


 


oxyCODONE/Acetaminophen [Percocet 1 tab PO Q6H PRN #5 tab 17





5/325 mg Tab]  


 


Dicyclomine [Bentyl] 20 mg PO BID PRN #30 tab 18


 


Ondansetron ODT [Zofran ODT] 1 odt PO Q6 PRN #20 odt 18


 


Oseltamivir [Tamiflu] 75 mg PO BID #10 cap 18


 


Ondansetron ODT [Zofran ODT] 4 mg PO Q8 PRN #10 odt 18














- Allergies


Allergies/Adverse Reactions: 


 Allergies











Allergy/AdvReac Type Severity Reaction Status Date / Time


 


wheat dextrin AdvReac  PAIN Verified 18 03:11














Review of Systems


ROS Statement: Except As Marked, All Systems Reviewed And Found Negative


Gastrointestinal: Positive for: Nausea, Vomiting


Neurological: Positive for: Headache





Physical Exam





- Reviewed


Nursing Documentation Reviewed: Yes


Vital Signs Reviewed: Yes





- Physical Exam


Appears: Positive for: Well, Non-toxic, Uncomfortable (vomiting)


Head Exam: Positive for: ATRAUMATIC, NORMAL INSPECTION, NORMOCEPHALIC


Skin: Positive for: Normal Color


Eye Exam: Positive for: Normal appearance, EOMI, PERRL


ENT: Positive for: Normal ENT Inspection


Cardiovascular/Chest: Positive for: Regular Rate, Rhythm


Respiratory: Positive for: Normal Breath Sounds


Gastrointestinal/Abdominal: Positive for: Normal Exam, Bowel Sounds, Soft


Back: Positive for: Normal Inspection


Extremity: Positive for: Normal ROM


Neurologic/Psych: Positive for: Alert, Oriented (x3)





- Laboratory Results


Result Diagrams: 


 18 03:45





 18 03:45





- ECG


O2 Sat by Pulse Oximetry: 98





- Progress


ED Course And Treament: 


cbc


cmp


IV fluids


IV reglan


CT head





Patient states headache/nausea slightly improved; IV benadryl, IV phenergan 

ordered 





EXAM:


CT Head Without Intravenous Contrast


CLINICAL HISTORY:


33 years old, female; Pain; Headache; Additional info: Headache, vomiting


TECHNIQUE:


Axial computed tomography images of the head/brain without intravenous 

contrast. All CT scans at


this facility use at least one of these dose optimization techniques: automated 

exposure control; mA


and/or kV adjustment per patient size (includes targeted exams where dose is 

matched to clinical


indication); or iterative reconstruction.


Coronal and sagittal reformatted images were created and reviewed.


COMPARISON:


CT HEAD OR BRAIN W/O CONT 2012 00:21


FINDINGS:


Brain: No significant white matter disease. No hemorrhage.


Ventricles: Unremarkable. No ventriculomegaly.


Bones/joints: Unremarkable. No acute fracture.


Soft tissues: Unremarkable.


Sinuses: Unremarkable as visualized. No acute sinusitis.


Mastoid air cells: Unremarkable as visualized. No mastoid effusion.


IMPRESSION:


No acute findings.





On re-eval, patient states headache and nausea improving. 


IV toradol, IV zofran ordered





On re-eval, patient states headache resolved.  Tolerating PO


Case discussed with ED attending Dr. Huntley, will discharge patient with 

instructions to follow up PMD for repeat Na level in 1-2 days


Patient educated on findings, states she will call her PMD in the am for f/up.


Rx Zofran provided


Advised pedialyte/gatorade


Return precautions given























Disposition





- Clinical Impression


Clinical Impression: 


 Headache, Hyponatremia








- Patient ED Disposition


Is Patient to be Admitted: No


Counseled Patient/Family Regarding: Studies Performed, Diagnosis, Need For 

Followup, Rx Given





- Disposition


Referrals: 


Lisa Urias MD [Primary Care Provider] - 


Disposition: Routine/Home


Disposition Time: 06:00


Condition: IMPROVED


Additional Instructions: 


Follow up with primary doctor for repeat sodium level within 1-2 days


Take Ibuprofen or Tylenol as directed, as needed for headache


Take Zofran, as directed, as needed for nausea/vomiting


Return to ED for worsening/concerning symptoms.


Prescriptions: 


Ondansetron ODT [Zofran ODT] 4 mg PO Q8 PRN #10 odt


 PRN Reason: Nausea/Vomiting


Instructions:  Headache, Adult, Hyponatremia

## 2018-08-01 VITALS — OXYGEN SATURATION: 98 %
